# Patient Record
Sex: MALE | Race: WHITE | Employment: STUDENT | URBAN - METROPOLITAN AREA
[De-identification: names, ages, dates, MRNs, and addresses within clinical notes are randomized per-mention and may not be internally consistent; named-entity substitution may affect disease eponyms.]

---
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
## 2022-05-02 PROBLEM — Z98.890 HISTORY OF REPAIR OF ACL: Status: ACTIVE | Noted: 2022-05-02

## 2022-05-02 PROBLEM — G89.29 CHRONIC PAIN OF RIGHT KNEE: Status: ACTIVE | Noted: 2022-05-02

## 2022-05-02 PROBLEM — M25.561 CHRONIC PAIN OF RIGHT KNEE: Status: ACTIVE | Noted: 2022-05-02

## 2023-03-08 ENCOUNTER — HOSPITAL ENCOUNTER (OUTPATIENT)
Dept: PHYSICAL THERAPY | Age: 23
Setting detail: RECURRING SERIES
Discharge: HOME | End: 2023-03-11
Payer: COMMERCIAL

## 2023-03-08 PROCEDURE — 97161 PT EVAL LOW COMPLEX 20 MIN: CPT

## 2023-03-08 PROCEDURE — 97110 THERAPEUTIC EXERCISES: CPT

## 2023-03-08 PROCEDURE — 97140 MANUAL THERAPY 1/> REGIONS: CPT

## 2023-03-08 ASSESSMENT — PAIN SCALES - GENERAL: PAINLEVEL_OUTOF10: 0

## 2023-03-08 NOTE — PROGRESS NOTES
Kentrell Archuleta  : 2000  Primary: Judy Del Angel Sc ([facility])  Secondary:  [address] @ [address] [place] [id number]  Phone: [phone]  Fax: [phone] Plan Frequency: 1-2 times a week for 8 weeks  Plan of Care/Certification Expiration Date: 23 (start of plan of care 3/8/2023)    PT Visit Info:  Plan Frequency: 1-2 times a week for 8 weeks  Plan of Care/Certification Expiration Date: 23 (start of plan of care 3/8/2023)    Visit Count:  1    OUTPATIENT PHYSICAL THERAPY:OP NOTE TYPE: Treatment Note 3/8/2023       Episode  }Appt Desk             Treatment Diagnosis:  Pain in Right Shoulder (M25.511)  Unspecified injury of shoulder and upper arm, unspecified arm, sequela (S49.90XS)  Sprain of acromioclavicular joint (S43.5)  Medical/Referring Diagnosis:  Unspecified injury of shoulder and upper arm, unspecified arm, sequela [S49.90XS]  Referring Physician:  Eric Fuentes MD MD Orders:  PT Eval and Treat   Date of Onset:  No data recorded   Allergies:   Patient has no allergy information on record. Restrictions/Precautions:  Restrictions/Precautions: None  No data recorded   Interventions Planned (Treatment may consist of any combination of the following):    Current Treatment Recommendations: Strengthening; ROM; Balance training; Functional mobility training; Transfer training; Manual; Neuromuscular re-education; Pain management; Home exercise program; Safety education & training; Modalities; Positioning; Dry needling; Therapeutic activities     Subjective Comments:  I hurt my shoulder snowboarding and they said I stretched some ligaments so I wore the sling for 2 weeks and I've been out of it for about 2 weeks now and starting to get back into moving my arm and doing workouts.   Initial:}    0 (at rest)/10Post Session:       2 (sore)/10  Medications Last Reviewed:  3/8/2023  Updated Objective Findings:  See evaluation note from today  Treatment   THERAPEUTIC EXERCISE: (25 minutes):    Exercises per grid below to improve mobility, strength, balance, and coordination. Required moderate visual, verbal, and manual cues to promote proper body alignment, promote proper body posture, and promote proper body mechanics. Progressed resistance, range, repetitions, and complexity of movement as indicated. Date:  3/8/2023 Date:   Date:     Activity/Exercise Parameters Parameters Parameters   PROM R shoulder All directions, 7 minutes     Scapular retractions 2 x 10     Upper trapezius stretch X 3     Levator scapula stretch X 3     Bilateral external rotation Yellow, x 10                         Time spent with patient reviewing proper muscle recruitment and technique with exercises. MANUAL THERAPY: (15 minutes):   Joint mobilization, Soft tissue mobilization, and Manipulation was utilized and necessary because of the patient's restricted joint motion, painful spasm, loss of articular motion, and restricted motion of soft tissue. Supine trigger point release and soft tissue mobilization to R upper trapezius, levator scapula , and pectoralis to decreased tightness and pain  R first rib mobilizations grades II-IV to improve mobility and positioning  Prone cervicothoracic junction manipulation with audible cavitation without thrust bilaterally to improve mobility and decrease tightness    MODALITIES: ( minutes):        None today      HEP: As above; handouts given to patient for all exercises. Treatment/Session Summary:    Treatment Assessment:  Patient with decreased tightness through R shoulder and cervical region at end of session, better scapular postioning noted. Good tolerance and understanding of HEP. Plan to progress stability exercises at tolerated.   Communication/Consultation:  Spoke with patient in regards to plan of care, progression of therapy sessions, and safety at home and with gym program.  Equipment provided today:  Patient given handout with above exercises for home. Recommendations/Intent for next treatment session: Next visit will focus on pain control, manual therapy and modalities as needed, progression of stability and strength as tolerated.     Total Treatment Billable Duration:  60 minutes: 20 evaluation, 25 therapeutic exercise, 15 manual  Time In: 1109  Time Out: 1210    Dayanara Wilburn, PT       Charge Capture  }Post Session Pain  PT Visit Info  [facility] Portal  MD Guidelines  Scanned Media  Benefits  Originalhart    Future Appointments   Date Time Provider Petty Wright   3/10/2023 10:00 AM Dayanara Wilburn, PT [facility]   3/13/2023  1:30 PM Dayanara Wilburn, PT [facility]   3/15/2023  1:30 PM Dayanara Wilburn, PT [facility]   3/20/2023  2:30 PM Dayanara Wilburn, PT [facility]   3/22/2023  9:00 AM Dayanara Wilburn, PT [facility]   3/27/2023 11:00 AM Dayanara Wilburn, PT [facility]   3/29/2023  9:00 AM Dayanara Wilburn, PT [facility]

## 2023-03-08 NOTE — THERAPY EVALUATION
Khadra Aguilarchrystal  : 2000  Primary: Harinder Walls ([facility])  Secondary:  [address] @ [address] [place] [id number]  Phone: [phone]  Fax: [phone] Plan Frequency: 1-2 times a week for 8 weeks  Plan of Care/Certification Expiration Date: 23 (start of plan of care 3/8/2023)    PT Visit Info:  Plan Frequency: 1-2 times a week for 8 weeks  Plan of Care/Certification Expiration Date: 23 (start of plan of care 3/8/2023)    Visit Count:  1                OUTPATIENT PHYSICAL THERAPY:             OP NOTE TYPE: Initial Assessment 3/8/2023               Episode (R shoulder pain/ AC joint sprain) Appt Desk         Treatment Diagnosis:  Pain in Right Shoulder (M25.511)  Unspecified injury of shoulder and upper arm, unspecified arm, sequela (S49.90XS)  Sprain of acromioclavicular joint (S43.5)  Medical/Referring Diagnosis:  Unspecified injury of shoulder and upper arm, unspecified arm, sequela [S49.90XS]  Referring Physician:  Michael Contreras MD MD Orders:  PT Eval and Treat   Return MD Appt:  as needed  Date of Onset:       Allergies:  Patient has no allergy information on record. Restrictions/Precautions:    Restrictions/Precautions: None      Medications Last Reviewed:  3/8/2023     SUBJECTIVE   History of Injury/Illness (Reason for Referral):  Mr. Brenna Bowling is a 25 y.o. male presenting to physical therapy with complaints of R shoulder pain following a snowboarding fall on 2/10/2023. Patient reports going to , being told he stretched some ligaments, and given a sling to wear for 2 weeks. Patient reports being compliant with the sling and being out of it for 2 weeks now. States he feels his ROM is getting better and he has less pain overall, but continues to have some sharp pains at times and is limited with his weight lifting due to not wanting to injure his R shoulder more.  Patient presents with increased pain, decreased strength, decreased ROM, decreased flexibility, impaired gait, impaired posture, impaired overhead reach, impaired transfer ability, decreased activity tolerance, and overall impaired functional mobility. Patient Stated Goal(s):  \"to be active\"  Initial:     0 (at rest)/10 Post Session:     2 (sore)/10  Past Medical History/Comorbidities:   Mr. Bere Lomas  has no past medical history on file. Mr. Bere Lomas  has a past surgical history that includes Urological Surgery. Patient with history of ACL reconstruction 4 years ago. Social History/Living Environment:   Lives With: Friend(s)  Type of Home: House     Prior Level of Function/Work/Activity:   Prior level of function: active with working out, recreation, weight lifting  Prior level of function: Independent           Learning:   Does the patient/guardian have any barriers to learning?: No barriers  Will there be a co-learner?: No  What is the preferred language of the patient/guardian?: English  Is an  required?: No  How does the patient/guardian prefer to learn new concepts?: Listening; Reading; Demonstration; Pictures/Videos     Fall Risk Scale: Santos Total Score: 0  Santos Fall Risk: Low (0-24)        Dominant Side:  right handed   OBJECTIVE     Patient denies  any headaches, changes in vision, dizziness, vertigo, nausea, drop attacks, black outs, tinnitus, dysphagia, dysarthria, LE symptoms or bowel/bladder dysfunction. Observation/Orthostatic Postural Assessment:          Patient with R shoulder slightly anterior and elevated compared to L, mild prominence of R AC joint compared to L. Palpation:          Moderate tenderness to palpation of R AC joint. Significant tenderness over R biceps tendon.     ROM:          Discomfort reported with active functional internal rotation and horizontal adduction on R  AROM/ PROM Left (degrees) Right (degrees)   Shoulder Flexion 170 150   Shoulder Abduction 160 145   Shoulder Internal Rotation  Neutral: 80 Neutral: 70 Functional Internal Rotation T7 T7   Shoulder External Rotation Neutral: 85  90 abduction: 90 Neutral: 80  90 abduction: 80   Functional External Rotation T3 T3     Strength: Motion Tested Left   (*/5) Right  (*/5)   Shoulder Flexion 5 4+   Scaption 5 4+   Shoulder Abduction 5 5   Shoulder Internal Rotation  5 4+   Shoulder External Rotation 5 4   Elbow Flexion 5 5   Elbow Extension 5 4+     Special Tests:     Impingement tests:  [place] test: positive on R  Rotator Cuff:  Drop sign: negative on R  IR Lag test:  ER Lag test:    Stability tests:  Horizontal Adduction test: positive on R  AC Compression/sheer test: positive on R  Step Deformity: mild on R  Glenoid labrum integrity tests:  Grind test: negative on R    Passive Accessory Motion:         Mild laxity through R AC joint. Glenohumeral joint stable. Neurological Screen:              Myotomes: Key muscle strength testing through bilateral UE is [facility]. Dermatomes: Sensation to light touch for bilateral UE is intact from C4 to T2. Reflexes: not tested    Functional Mobility:         Patient with good functional ROM, but reports \"weird feeling\" at end range flexion and some pain/ sharp catches at times with horizontal adduction. Would like to return to full exercise at the gym including weight lifting. Able to run without R shoulder pain. Balance:          Sitting and standing balance grossly intact. ASSESSMENT   Initial Assessment:  Mr. Kamran Schneider is a 25 y.o. male presenting to physical therapy with complaints of R shoulder pain following a snowboarding fall on 2/10/2023. Patient reports going to , being told he stretched some ligaments, and given a sling to wear for 2 weeks. Patient reports being compliant with the sling and being out of it for 2 weeks now.  States he feels his ROM is getting better and he has less pain overall, but continues to have some sharp pains at times and is limited with his weight lifting due to not wanting to injure his R shoulder more. Patient presents with increased pain, decreased strength, decreased ROM, decreased flexibility, impaired gait, impaired posture, impaired overhead reach, impaired transfer ability, decreased activity tolerance, and overall impaired functional mobility. Patient is a good candidate for skilled physical therapy interventions to include manual therapy, therapeutic exercise, balance training, gait training, transfer training, postural re-education, body mechanics training, and pain modalities as needed. Problem List: (Impacting functional limitations): Body Structures, Functions, Activity Limitations Requiring Skilled Therapeutic Intervention: Decreased functional mobility ; Decreased ROM; Decreased ADL status; Decreased body mechanics; Decreased strength; Decreased tolerance to work activity; Increased pain; Decreased posture     Therapy Prognosis:   Therapy Prognosis: Good     Initial Assessment Complexity:   Decision Making: Low Complexity    PLAN   Effective Dates: 3/8/2023 TO Plan of Care/Certification Expiration Date: 05/07/23 (start of plan of care 3/8/2023)   Frequency/Duration: Plan Frequency: 1-2 times a week for 8 weeks   Interventions Planned (Treatment may consist of any combination of the following):    Current Treatment Recommendations: Strengthening; ROM; Balance training; Functional mobility training; Transfer training; Manual; Neuromuscular re-education; Pain management; Home exercise program; Safety education & training; Modalities; Positioning; Dry needling; Therapeutic activities     GOALS: (Goals have been discussed and agreed upon with patient.)  Short-Term Functional Goals: Time Frame: 3/8/2023  to 4/7/2023  Patient demonstrates independence with home exercise program without verbal cueing provided by therapist.   Patient will report no more than 1/10 R shoulder pain at rest in order to demonstrate improved self pain control and tolerance.    Patient will be educated in and demonstrate improved upright posture including decreased forward head and R scapular elevation to improve clearance for overhead activities. .   Discharge Goals: Time Frame: 3/8/2023  to 5/7/2023  Patient will improve gross R UE strength to 5/5 with minimal to no complaints of pain in order to return to prior gym program safely. Patient will report no episodes of sharp R shoulder pain with regular activities in order to demonstrate improved stability and activity tolerance. Patient will be able to sleep on R side for part of the night with minimal complaints of pain in order to return to prior sleeping pattern for health and wellness. Patient will improve Disability of the Arm, Shoulder, and Hand score to 17/55 from 22/55. Outcome Measure: Tool Used: Disabilities of the Arm, Shoulder and Hand (DASH) Questionnaire - Quick Version  Score:  Initial: 22/55  Most Recent: X/55 (Date: -- )   Interpretation of Score: The DASH is designed to measure the activities of daily living in person's with upper extremity dysfunction or pain. Each section is scored on a 1-5 scale, 5 representing the greatest disability. The scores of each section are added together for a total score of 55. Medical Necessity:   > Patient is expected to demonstrate progress in strength, range of motion, coordination, and functional technique to improve safety during overhead activities and recreation.  > Skilled intervention continues to be required due to R shoulder pain with certain motions following snowboarding accident. Reason For Services/Other Comments:  > Patient continues to require skilled intervention due to R shoulder pain hindering functional mobility and return to prior level of function. Total Duration:  Time In: 1109  Time Out: 1210    Regarding Harsh Ramos's therapy, I certify that the treatment plan above will be carried out by a therapist or under their direction.   Thank you for this referral,  Toi Berumen, PT     Referring Physician Signature: Adrián Salinas _______________________________ Date _____________        Post Session Pain  Charge Capture  PT Visit Info MD Guidelines  Marlen

## 2023-03-10 ENCOUNTER — HOSPITAL ENCOUNTER (OUTPATIENT)
Dept: PHYSICAL THERAPY | Age: 23
Setting detail: RECURRING SERIES
Discharge: HOME | End: 2023-03-13
Payer: COMMERCIAL

## 2023-03-10 PROCEDURE — 97140 MANUAL THERAPY 1/> REGIONS: CPT

## 2023-03-10 PROCEDURE — 97110 THERAPEUTIC EXERCISES: CPT

## 2023-03-10 ASSESSMENT — PAIN SCALES - GENERAL: PAINLEVEL_OUTOF10: 2

## 2023-03-10 NOTE — PROGRESS NOTES
Dasia Leatha  : 2000  Primary: Erickson Walls ([facility])  Secondary:  [address] @ [address] [place] [id number]  Phone: [phone]  Fax: [phone] Plan Frequency: 1-2 times a week for 8 weeks  Plan of Care/Certification Expiration Date: 23 (start of plan of care 3/8/2023)      PT Visit Info:  Plan Frequency: 1-2 times a week for 8 weeks  Plan of Care/Certification Expiration Date: 23 (start of plan of care 3/8/2023)      Visit Count:  2    OUTPATIENT PHYSICAL THERAPY:OP NOTE TYPE: Treatment Note 3/10/2023       Episode  }Appt Desk             Treatment Diagnosis:  Pain in Right Shoulder (M25.511)  Unspecified injury of shoulder and upper arm, unspecified arm, sequela (S49.90XS)  Sprain of acromioclavicular joint (S43.5)  Medical/Referring Diagnosis:  Unspecified injury of shoulder and upper arm, unspecified arm, sequela [S49.90XS]  Referring Physician:  Lillian Gordon MD MD Orders:  PT Eval and Treat   Date of Onset:  No data recorded   Allergies:   Patient has no allergy information on record. Restrictions/Precautions:  Restrictions/Precautions: None  No data recorded   Interventions Planned (Treatment may consist of any combination of the following):    Current Treatment Recommendations: Strengthening; ROM; Balance training; Functional mobility training; Transfer training; Manual; Neuromuscular re-education; Pain management; Home exercise program; Safety education & training; Modalities; Positioning; Dry needling; Therapeutic activities     Subjective Comments:  Patient late for appointment due to falling back to sleep this morning. States he has been doing his exercises since first visit without pain.   Initial:}    2/10Post Session:       4/10  Medications Last Reviewed:  3/10/2023  Updated Objective Findings:   good mobility following scapular mobilizations today  Treatment   THERAPEUTIC EXERCISE: (23 minutes): Exercises per grid below to improve mobility, strength, balance, and coordination. Required moderate visual, verbal, and manual cues to promote proper body alignment, promote proper body posture, and promote proper body mechanics. Progressed resistance, range, repetitions, and complexity of movement as indicated. Date:  3/8/2023 Date:  3/10/2023 Date:     Activity/Exercise Parameters Parameters Parameters   PROM R shoulder All directions, 7 minutes All directions, 7 minutes    Scapular retractions 2 x 10 2 x 10    Upper trapezius stretch X 3 X 3     Levator scapula stretch X 3 X 3    Bilateral external rotation Yellow, x 10 Yellow, 3 x 10    Sidelying ER --- 2 lbs, 3 x 10    Sidelying abduction --- 2 lbs, 3 x 10    Band rows --- Red, 2 x 10    Band low row --- Red, 2 x 10    Band internal rotation --- Red, x 10    Band external rotation --- Red, x 10      Time spent with patient reviewing proper muscle recruitment and technique with exercises. MANUAL THERAPY: (15 minutes):   Joint mobilization, Soft tissue mobilization, and Manipulation was utilized and necessary because of the patient's restricted joint motion, painful spasm, loss of articular motion, and restricted motion of soft tissue. Supine trigger point release and soft tissue mobilization to R upper trapezius, levator scapula , and pectoralis to decreased tightness and pain  R first rib mobilizations grades II-IV to improve mobility and positioning  Prone cervicothoracic junction manipulation with audible cavitation without thrust bilaterally to improve mobility and decrease tightness- NOT TODAY  Sidelying R scapular mobilizations all directions to improve positioning and stabilization    MODALITIES: ( minutes):        None today      HEP: As above; handouts given to patient for all exercises. Treatment/Session Summary:    Treatment Assessment:  Some soreness at end of session and fatigue from exercises, but no increased AC joint pain.  Plan to progress postural and stability exercises as tolerated. Communication/Consultation:   reviewed HEP  Equipment provided today:  None today  Recommendations/Intent for next treatment session: Next visit will focus on pain control, manual therapy and modalities as needed, progression of stability and strength as tolerated.     Total Treatment Billable Duration:  38 minutes- patient 20 minutes late for appointment  Time In: 1024  Time Out: 1104    Natalie Ronquillo, PT       Charge Capture  }Post Session Pain  PT Visit Info  [facility] Portal  MD Guidelines  Scanned Media  Benefits  MyChart    Future Appointments   Date Time Provider Petty Wright   3/13/2023  1:30 PM Natalie Ronquillo, PT [facility]   3/15/2023  1:30 PM Natalie Ronquillo, PT [facility]   3/20/2023  2:30 PM Natalie Ronquillo, PT [facility]   3/22/2023  9:00 AM Natalie Ronquillo, PT [facility]   3/27/2023 11:00 AM Natalie Ronquillo, PT [facility]   3/29/2023  9:00 AM Natalie Ronquillo, PT [facility]

## 2023-03-13 ENCOUNTER — HOSPITAL ENCOUNTER (OUTPATIENT)
Dept: PHYSICAL THERAPY | Age: 23
Setting detail: RECURRING SERIES
Discharge: HOME | End: 2023-03-16
Payer: COMMERCIAL

## 2023-03-13 PROCEDURE — 97110 THERAPEUTIC EXERCISES: CPT

## 2023-03-13 PROCEDURE — 97140 MANUAL THERAPY 1/> REGIONS: CPT

## 2023-03-13 ASSESSMENT — PAIN SCALES - GENERAL: PAINLEVEL_OUTOF10: 0

## 2023-03-13 NOTE — PROGRESS NOTES
Inez Pittman  : 2000  Primary: Osmany Walls ([facility])  Secondary:  [address] @ [address] [place] [id number]  Phone: [phone]  Fax: [phone] Plan Frequency: 1-2 times a week for 8 weeks  Plan of Care/Certification Expiration Date: 23 (start of plan of care 3/8/2023)      PT Visit Info:  Plan Frequency: 1-2 times a week for 8 weeks  Plan of Care/Certification Expiration Date: 23 (start of plan of care 3/8/2023)      Visit Count:  3    OUTPATIENT PHYSICAL THERAPY:OP NOTE TYPE: Treatment Note 3/13/2023       Episode  }Appt Desk             Treatment Diagnosis:  Pain in Right Shoulder (M25.511)  Unspecified injury of shoulder and upper arm, unspecified arm, sequela (S49.90XS)  Sprain of acromioclavicular joint (S43.5)  Medical/Referring Diagnosis:  Unspecified injury of shoulder and upper arm, unspecified arm, sequela [S49.90XS]  Referring Physician:  Deep Jara MD MD Orders:  PT Eval and Treat   Date of Onset:  Onset Date: 02/10/23 (patient injured R shoulder snowboarding)   Allergies:   Patient has no allergy information on record. Restrictions/Precautions:  Restrictions/Precautions: None  No data recorded   Interventions Planned (Treatment may consist of any combination of the following):    Current Treatment Recommendations: Strengthening; ROM; Balance training; Functional mobility training; Transfer training; Manual; Neuromuscular re-education; Pain management; Home exercise program; Safety education & training; Modalities; Positioning; Dry needling; Therapeutic activities     Subjective Comments:  Patient reports being a little sore after last session. Feeling a little better each day.   Initial:}    0/10Post Session:       2 (sore/ fatigued)/10  Medications Last Reviewed:  3/13/2023  Updated Objective Findings:   quick to fatigue with stabilization exercises  Treatment   THERAPEUTIC EXERCISE: (43 minutes): Exercises per grid below to improve mobility, strength, balance, and coordination. Required moderate visual, verbal, and manual cues to promote proper body alignment, promote proper body posture, and promote proper body mechanics. Progressed resistance, range, repetitions, and complexity of movement as indicated. Date:  3/8/2023 Date:  3/10/2023 Date:  3/13/23   Activity/Exercise Parameters Parameters Parameters   PROM R shoulder All directions, 7 minutes All directions, 7 minutes All direction, 5 minutes   Scapular retractions 2 x 10 2 x 10 ---   Upper trapezius stretch X 3 X 3  reviewed   Levator scapula stretch X 3 X 3 reviewed   Bilateral external rotation Yellow, x 10 Yellow, 3 x 10 Red, 3 x 10   Sidelying ER --- 2 lbs, 3 x 10 2 lbs, 3 x 10   Sidelying abduction --- 2 lbs, 3 x 10 2 lbs, 3 x 10   Band rows --- Red, 2 x 10 Green, 3 x 10   Band low row --- Red, 2 x 10 Red, 3 x 10   Band internal rotation --- Red, x 10 Red, 2 x 10   Band external rotation --- Red, x 10 Red, 2 x 10   Wall slides --- --- Yellow loop, 2 x 5   Band oscillations --- --- Yellow loop, flexion to shoulder height, 2 x 10   Wall ball stabilizations --- --- Red ball, x 20 each   Shoulder scaption --- --- Bolster on wall, 2 lbs, 2 x 10   Shoulder abduction --- --- Bolster on wall, 2 lbs, 2 x 10     Time spent with patient reviewing proper muscle recruitment and technique with exercises. MANUAL THERAPY: (15 minutes):   Joint mobilization, Soft tissue mobilization, and Manipulation was utilized and necessary because of the patient's restricted joint motion, painful spasm, loss of articular motion, and restricted motion of soft tissue.    Supine trigger point release and soft tissue mobilization to R upper trapezius, levator scapula , and pectoralis to decreased tightness and pain  R first rib mobilizations grades II-IV to improve mobility and positioning  Prone cervicothoracic junction manipulation with audible cavitation without thrust bilaterally to improve mobility and decrease tightness  Sidelying R scapular mobilizations all directions to improve positioning and stabilization    MODALITIES: ( minutes):        None today      HEP: As above; handouts given to patient for all exercises. Treatment/Session Summary:    Treatment Assessment:  Patient quick to fatigue with stabilization exercises, but no complaints of pain. Some soreness/ fatigue at end of session. Good posturing and technique with exercises  Communication/Consultation:   reviewed HEP  Equipment provided today:  None today  Recommendations/Intent for next treatment session: Next visit will focus on pain control, manual therapy and modalities as needed, progression of stability and strength as tolerated.     Total Treatment Billable Duration:  58 minutes  Time In: 4790  Time Out: 8960    Rosa Gonzales, PT       Charge Capture  }Post Session Pain  PT Visit Info  [employer] Portal  MD Guidelines  Scanned Media  Benefits  MyChart    Future Appointments   Date Time Provider Petty Wright   3/15/2023  1:30 PM Rosa Gonzales, PT [facility]   3/20/2023  2:30 PM Rosa Gonzales, PT [facility]   3/22/2023  9:00 AM Rosa Gonzales, PT [facility]   3/27/2023 11:00 AM Rosa Gonzales, PT [facility]   3/29/2023  9:00 AM Rosa Gonzales, PT [facility]

## 2023-03-20 ENCOUNTER — HOSPITAL ENCOUNTER (OUTPATIENT)
Dept: PHYSICAL THERAPY | Age: 23
Setting detail: RECURRING SERIES
Discharge: HOME | End: 2023-03-23
Payer: COMMERCIAL

## 2023-03-20 PROCEDURE — 97140 MANUAL THERAPY 1/> REGIONS: CPT

## 2023-03-20 PROCEDURE — 97110 THERAPEUTIC EXERCISES: CPT

## 2023-03-20 ASSESSMENT — PAIN SCALES - GENERAL: PAINLEVEL_OUTOF10: 0

## 2023-03-20 NOTE — PROGRESS NOTES
tissues to decreased tight and sore muscles     MODALITIES: ( minutes):        None today      HEP: As above; handouts given to patient for all exercises. Treatment/Session Summary:    Treatment Assessment:Pt. Compliant with all exercises and some soreness after session from exercises     Communication/Consultation:   reviewed HEP  Equipment provided today:  None today  Recommendations/Intent for next treatment session: Next visit will focus on pain control, manual therapy and modalities as needed, progression of stability and strength as tolerated.     Total Treatment Billable Duration:  58 minutes  Time In: 1430  Time Out: CATIE Fu 51, PTA       Charge Capture  }Post Session Pain  PT Visit Info  [employer] Portal  MD Guidelines  Scanned Media  Benefits  MyChart    Future Appointments   Date Time Provider Petty Wright   3/22/2023  9:00 AM [facility] GRANT   3/27/2023 11[facility] COLIN BURNS   3/29/2023  [facility]

## 2023-03-22 ENCOUNTER — HOSPITAL ENCOUNTER (OUTPATIENT)
Dept: PHYSICAL THERAPY | Age: 23
Setting detail: RECURRING SERIES
End: 2023-03-22
Payer: COMMERCIAL

## 2023-03-23 ENCOUNTER — HOSPITAL ENCOUNTER (OUTPATIENT)
Dept: PHYSICAL THERAPY | Age: 23
Setting detail: RECURRING SERIES
Discharge: HOME | End: 2023-03-26
Payer: COMMERCIAL

## 2023-03-23 PROCEDURE — 97110 THERAPEUTIC EXERCISES: CPT

## 2023-03-23 NOTE — PROGRESS NOTES
[address] at [facility] 3/23/2023      Patient's appointment canceled due to PT being out sick. Able to reschedule second appointment this week.        Bc Bowers, PT, DPT, OMT-C

## 2023-03-23 NOTE — PROGRESS NOTES
received soft tissue mobilization to right shoulder and surrounding tissues to decreased tight and sore muscles     MODALITIES: ( minutes):        None today      HEP: As above; handouts given to patient for all exercises. Treatment/Session Summary:    Treatment Assessment:   Patient demonstrates tolerance to increase in load and intensity if exercises. Patient occasionally feels tightness at the [facility] joint but denies any significant pain. Patient is advised to avoid excessive shoulder extension and horizontal/vertical pressing exercises. Communication/Consultation:  None today  Equipment provided today:  None today  Recommendations/Intent for next treatment session: Next visit will focus on pain control, manual therapy and modalities as needed, progression of stability and strength as tolerated.     Total Treatment Billable Duration:  58 minutes  Time In: 1100  Time Out: 516 [facility], PT       Charge Capture  }Post Session Pain  PT Visit [address] [facility] Portal  MD Guidelines  Scanned Media  Benefits  MyChart    Future Appointments   Date Time Provider Petty Wright   3/27/2023 11:00 AM Naga Morales PT [facility]   3/29/2023  9:00 AM [facility]

## 2023-03-24 ASSESSMENT — PAIN SCALES - GENERAL: PAINLEVEL_OUTOF10: 0

## 2023-03-27 ENCOUNTER — HOSPITAL ENCOUNTER (OUTPATIENT)
Dept: PHYSICAL THERAPY | Age: 23
Setting detail: RECURRING SERIES
Discharge: HOME | End: 2023-03-30
Payer: COMMERCIAL

## 2023-03-27 PROCEDURE — 97140 MANUAL THERAPY 1/> REGIONS: CPT

## 2023-03-27 PROCEDURE — 97110 THERAPEUTIC EXERCISES: CPT

## 2023-03-27 NOTE — PROGRESS NOTES
and restricted motion of soft tissue. Supine trigger point release to R upper trapezius and pectoralis to decrease tightness and spasms  R first rib mobilizations    MODALITIES: ( minutes):        None today      HEP: As above; handouts given to patient for all exercises. Treatment/Session Summary:    Treatment Assessment:  Good tolerance for exercises today with good technique, but fatigued by end of session. Decreased tightness following manual.   Communication/Consultation:  Spoke with patient in regards to safety with gym program and avoidance of certain motions in his R shoulder. Equipment provided today:  None today  Recommendations/Intent for next treatment session: Next visit will focus on pain control, manual therapy and modalities as needed, progression of stability and strength as tolerated.     Total Treatment Billable Duration:  55 minutes  Time In: 1106  Time Out: 0501    Jayshree Russo PT       Charge Capture  }Post Session Pain  PT Visit Info  [employer] Portal  MD Guidelines  Scanned Media  Benefits  MyChart    Future Appointments   Date Time Provider Petty Adriana   3/29/2023  9:00 AM Jayshree Russo PT [facility]

## 2023-03-29 ENCOUNTER — HOSPITAL ENCOUNTER (OUTPATIENT)
Dept: PHYSICAL THERAPY | Age: 23
Setting detail: RECURRING SERIES
Discharge: HOME | End: 2023-04-01
Payer: COMMERCIAL

## 2023-03-29 PROCEDURE — 97110 THERAPEUTIC EXERCISES: CPT

## 2023-03-29 PROCEDURE — 97140 MANUAL THERAPY 1/> REGIONS: CPT

## 2023-03-29 ASSESSMENT — PAIN SCALES - GENERAL: PAINLEVEL_OUTOF10: 0

## 2023-03-29 NOTE — PROGRESS NOTES
EXERCISE: (35 minutes):    Exercises per grid below to improve mobility, strength, balance, and coordination. Required moderate visual, verbal, and manual cues to promote proper body alignment, promote proper body posture, and promote proper body mechanics. Progressed resistance, range, repetitions, and complexity of movement as indicated. Date:  3/29/2023 Date:  3/23/2023 Date:  3/27/2023   Activity/Exercise Parameters Parameters Parameters   PROM R shoulder All directions, 5 minutes  ---   UBE --- --- Upright posture, 4 minutes forward, 4 minute backwards   Scapular retractions --- Band pull apart: 3x15 ---   Upper trapezius stretch ---  HEP   Levator scapula stretch ---  HEP   Bilateral external rotation Green, 3 x 10 Yellow, 3 x 10 Red loop, 2 x 10   Bilateral horizontal abduction Green, 3 x 10     Band rows Cable, 20 lbs, 3 x 10 Cable: 3x12, 15 lbs. Cable, 15 lbs, 3 x 15   Band low row ---  Green, 2 x 10   Band internal rotation Reviewed Red, x 10 Green, 3 x 10   Band external rotation Reviewed Red, x 10 Green, 3 x 10   Wall slides --- Yellow loop 2x10 reps  Red loop, 2 x 10   Band oscillations --- Yellow loop, flexion to shoulder height 2x10  Red loop, flexion to shoulder height 2x10    Wall ball stabilizations --- Red ball x 30 reps CW & CCW  ---   Shoulder flexion --- --- Bolster on wall, 3 lbs, 2 x 10   Shoulder scaption --- Bolster on wall 2 lb wt. 2x10 reps right UE  Bolster on wall, 3 lbs, 2 x 10   Shoulder abduction --- --- Bolster on wall, 3 lbs, 2 x 10   Band PNF Green, 3 x 10 --- Red, 2 x 10   Push up and plank position 5 minute review of positioning and practice       Time spent with patient reviewing proper muscle recruitment and technique with exercises.      MANUAL THERAPY: (10 minutes):   Joint mobilization, Soft tissue mobilization, and Manipulation was utilized and necessary because of the patient's restricted joint motion, painful spasm, loss of articular motion, and restricted motion of
needling; Therapeutic activities     GOALS: (Goals have been discussed and agreed upon with patient.)  Short-Term Functional Goals: Time Frame: 3/8/2023  to 4/7/2023  Patient demonstrates independence with home exercise program without verbal cueing provided by therapist. -GOAL MET  Patient will report no more than 1/10 R shoulder pain at rest in order to demonstrate improved self pain control and tolerance. -GOAL MET  Patient will be educated in and demonstrate improved upright posture including decreased forward head and R scapular elevation to improve clearance for overhead activities. - GOAL MET  Discharge Goals: Time Frame: 3/8/2023  to 5/7/2023  Patient will improve gross R UE strength to 5/5 with minimal to no complaints of pain in order to return to prior gym program safely. -ONGOING  Patient will report no episodes of sharp R shoulder pain with regular activities in order to demonstrate improved stability and activity tolerance. -GOAL MET  Patient will be able to sleep on R side for part of the night with minimal complaints of pain in order to return to prior sleeping pattern for health and wellness. -GOAL MET  Patient will improve Disability of the Arm, Shoulder, and Hand score to 17/55 from 22/55. -GOAL MET         Outcome Measure: Tool Used: Disabilities of the Arm, Shoulder and Hand (DASH) Questionnaire - Quick Version  Score:  Initial: 22/55  Most Recent: 15/55 (Date: 3/29/2023 )   Interpretation of Score: The DASH is designed to measure the activities of daily living in person's with upper extremity dysfunction or pain. Each section is scored on a 1-5 scale, 5 representing the greatest disability. The scores of each section are added together for a total score of 55.       Medical Necessity:   > Patient is expected to demonstrate progress in strength, range of motion, coordination, and functional technique to improve safety during overhead activities and recreation.  > Skilled intervention

## 2023-07-31 NOTE — PROCEDURE: PATHOLOGY BILLING
Number Of Multiplex Special Stains Used (71306): None Pinch Graft Text: The defect edges were debeveled with a #15 scalpel blade. Given the location of the defect, shape of the defect and the proximity to free margins a pinch graft was deemed most appropriate. Using a sterile surgical marker, the primary defect shape was transferred to the donor site. The area thus outlined was incised deep to adipose tissue with a #15 scalpel blade.  The harvested graft was then trimmed of adipose tissue until only dermis and epidermis was left. The skin margins of the secondary defect were undermined to an appropriate distance in all directions utilizing iris scissors.  The secondary defect was closed with interrupted buried subcutaneous sutures.  The skin edges were then re-apposed with running  sutures.  The skin graft was then placed in the primary defect and oriented appropriately.

## (undated) DEVICE — 4-PORT MANIFOLD: Brand: NEPTUNE 2

## (undated) DEVICE — REM POLYHESIVE ADULT PATIENT RETURN ELECTRODE: Brand: VALLEYLAB

## (undated) DEVICE — ZIMMER® STERILE DISPOSABLE TOURNIQUET CUFF WITH PLC, DUAL PORT, SINGLE BLADDER, 30 IN. (76 CM)

## (undated) DEVICE — T-DRAPE,EXTREMITY,STERILE: Brand: MEDLINE

## (undated) DEVICE — BUTTON SWITCH PENCIL BLADE ELECTRODE, HOLSTER: Brand: EDGE

## (undated) DEVICE — PRECISION THIN (9.0 X 0.38 X 31.0MM)

## (undated) DEVICE — (D)STRIP SKN CLSR 0.5X4IN WHT --

## (undated) DEVICE — SOLUTION IRRIG 3000ML 0.9% SOD CHL FLX CONT 0797208] ICU MEDICAL INC]

## (undated) DEVICE — SURGICAL PROCEDURE PACK BASIC ST FRANCIS

## (undated) DEVICE — 2DE12 2-0 UNDYD MONODERM 14X14: Brand: 2DE12 2-0 UNDYD MONODERM 14X14

## (undated) DEVICE — SUTURE NONABSORBABLE BRAIDED 5-0 30 IN ETHBND EXCEL D7809

## (undated) DEVICE — CANNULA ARTHSCP L9MM CLR DISP SEAL TRAC

## (undated) DEVICE — STERILE HOOK LOCK LATEX FREE ELASTIC BANDAGE 6INX5YD: Brand: HOOK LOCK™

## (undated) DEVICE — [RESECTOR CUTTER, ARTHROSCOPIC SHAVER BLADE,  DO NOT RESTERILIZE,  DO NOT USE IF PACKAGE IS DAMAGED,  KEEP DRY,  KEEP AWAY FROM SUNLIGHT]: Brand: FORMULA

## (undated) DEVICE — WATERPROOF, BACTERIA PROOF DRESSING WITH ABSORBENT SEE THROUGH PAD: Brand: OPSITE POST-OP VISIBLE 15X10CM CTN 20

## (undated) DEVICE — BANDAGE COMPR SELF ADH 5 YDX6 IN TAN STRL PREMIERPRO LF

## (undated) DEVICE — (D)PREP SKN CHLRAPRP APPL 26ML -- CONVERT TO ITEM 371833

## (undated) DEVICE — SUTURE MCRYL SZ 2-0 L27IN ABSRB UD CP-1 1 L36MM 1/2 CIR REV Y266H

## (undated) DEVICE — SET ENDOSCP FIX ACL FOR BNE TEND RECON DISP VERSITOMIC

## (undated) DEVICE — KNIFE SURG 10MM GRFT DISP FOR ACL RECON

## (undated) DEVICE — INTENDED FOR TISSUE SEPARATION, AND OTHER PROCEDURES THAT REQUIRE A SHARP SURGICAL BLADE TO PUNCTURE OR CUT.: Brand: BARD-PARKER ® STAINLESS STEEL BLADES

## (undated) DEVICE — OUTFLOW CASSETTE TUBING, DO NOT USE IF PACKAGE IS DAMAGED: Brand: CROSSFLOW

## (undated) DEVICE — INFLOW CASSETTE TUBING, DO NOT USE IF PACKAGE IS DAMAGED: Brand: CROSSFLOW

## (undated) DEVICE — SUTURE PDS II SZ 0 L27IN ABSRB VLT L26MM CT-2 1/2 CIR Z334H

## (undated) DEVICE — STOCKINETTE,IMPERVIOUS,12X48,STERILE: Brand: MEDLINE